# Patient Record
Sex: MALE | Race: WHITE | NOT HISPANIC OR LATINO | Employment: FULL TIME | URBAN - METROPOLITAN AREA
[De-identification: names, ages, dates, MRNs, and addresses within clinical notes are randomized per-mention and may not be internally consistent; named-entity substitution may affect disease eponyms.]

---

## 2023-09-26 ENCOUNTER — LAB (OUTPATIENT)
Dept: LAB | Facility: LAB | Age: 23
End: 2023-09-26
Payer: COMMERCIAL

## 2023-09-26 LAB — TOBACCO SCREEN, URINE: NEGATIVE

## 2023-12-07 ENCOUNTER — EVALUATION (OUTPATIENT)
Dept: PHYSICAL THERAPY | Facility: CLINIC | Age: 23
End: 2023-12-07
Payer: COMMERCIAL

## 2023-12-07 DIAGNOSIS — S76.012A STRAIN OF LEFT HIP ADDUCTOR MUSCLE: ICD-10-CM

## 2023-12-07 DIAGNOSIS — S76.012A STRAIN OF LEFT HIP ADDUCTOR MUSCLE, INITIAL ENCOUNTER: Primary | ICD-10-CM

## 2023-12-07 PROCEDURE — 97530 THERAPEUTIC ACTIVITIES: CPT | Mod: GP | Performed by: PHYSICAL THERAPIST

## 2023-12-07 PROCEDURE — 97161 PT EVAL LOW COMPLEX 20 MIN: CPT | Mod: GP | Performed by: PHYSICAL THERAPIST

## 2023-12-07 PROCEDURE — 97110 THERAPEUTIC EXERCISES: CPT | Mod: GP | Performed by: PHYSICAL THERAPIST

## 2023-12-07 ASSESSMENT — ENCOUNTER SYMPTOMS
LOSS OF SENSATION IN FEET: 0
OCCASIONAL FEELINGS OF UNSTEADINESS: 0
DEPRESSION: 0

## 2023-12-07 NOTE — PROGRESS NOTES
Physical Therapy Evaluation and Treatment      Patient Name: Murali García  MRN: [id number]  Today's Date: 12/7/2023  Time Calculation  Start Time: 1445  Stop Time: 1526  Time Calculation (min): 41 min  PT Therapeutic Procedures Time Entry  Therapeutic Exercise Time Entry: 16  Therapeutic Activity Time Entry: 10    Current Problem:   1. Strain of left hip adductor muscle, initial encounter        2. 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 Unsure if he twisted wrong when playing. Pain has gotten slightly better but it is still uncomfortable. Planting foot and trying to move is uncomfortable. Turning over in bed is hard. Coughing, sneezing also painful. Xrays negative. Reports never stretching.     Works in maintenance at  [employer]. Would like to return to [employer].     Precautions: None  Pain: 4-6/10       Objective   ROM   Left Hip AROM:  Hip flex 95 deg  Hip ER 10 deg  Hip IR neutral (very limited here)  *Pain with endrange AROM     MMT   Left LE strength:  Hip flex 5/5  Hip ADD 4-/5 with pain  Hip ABD 4/5   Knee flex 5/5  Knee ext 4/5 with pain    Dermatomes/Myotomes/Reflexes  Denies numbness/tingling    Palpation   TTP left hip adductors. TTP left lower abdominals     Flexibility  Moderate tightness in left hip adductors       Gait   Ambulates with mild left LE in ER, slight decrease in madyson     Stairs   Normal pattern    Outcome Measures:  LEFS: 49/80    Treatments:  Therapeutic Exercise:  Adductor stretch at step, 30 seconds hold  Bridge, x10  Hip ADD with ball, x10  S/L hip ADD, x10    Therapeutic Activity:  Educated pt on eval findings and POC  Educated pt on anatomy of hip  Advised pt to avoid hockey at this time. Discussed timeframe to heal    Assessment   Pt is a 23 y.o. male with left hip adductor  strain. Pt with decreased ROM, reduced strength, gait deficits, tenderness to palpation, and flexibility restrictions. Pt will benefit from skilled PT to address the above deficits for return to full activities.       Low complexity due to patient's clinical presentation being stable and uncomplicated by any significant comorbidities that may affect rehab tolerance and progression.     Plan:   Treatment/Interventions: Dry needling, Education/ Instruction, Gait training, Manual therapy, Neuromuscular re-education, Self care/ home management, Taping techniques, Therapeutic activities, Therapeutic exercises  PT Plan: Skilled PT  PT Frequency: 1 time per week  Duration: 3 more visits  Rehab Potential: Excellent  Plan of Care Agreement: Patient        Goals:   Active       Mobility       Goal 1       Start:  12/07/23    Expected End:  03/06/24       Pt will improve left hip AROM to WNL to improve sport activities         Goal 2       Start:  12/07/23    Expected End:  03/06/24       Pt will improve left LE strength to 5/5 to improve sport activities            Pain       Goal 1       Start:  12/07/23    Expected End:  03/06/24       Pt will perform all work activities with 0/10 pain         Goal 2       Start:  12/07/23    Expected End:  03/06/24       Pt will return to full hockey activities with 0/10 pain.

## 2023-12-14 ENCOUNTER — TREATMENT (OUTPATIENT)
Dept: PHYSICAL THERAPY | Facility: CLINIC | Age: 23
End: 2023-12-14
Payer: COMMERCIAL

## 2023-12-14 DIAGNOSIS — S76.012D STRAIN OF LEFT HIP ADDUCTOR MUSCLE, SUBSEQUENT ENCOUNTER: Primary | ICD-10-CM

## 2023-12-14 DIAGNOSIS — S76.012A STRAIN OF LEFT HIP ADDUCTOR MUSCLE: ICD-10-CM

## 2023-12-14 PROCEDURE — 97110 THERAPEUTIC EXERCISES: CPT | Mod: GP,CQ

## 2023-12-14 ASSESSMENT — PAIN - FUNCTIONAL ASSESSMENT: PAIN_FUNCTIONAL_ASSESSMENT: 0-10

## 2023-12-14 ASSESSMENT — PAIN DESCRIPTION - DESCRIPTORS: DESCRIPTORS: DISCOMFORT;JABBING;SHARP

## 2023-12-14 ASSESSMENT — PAIN SCALES - GENERAL: PAINLEVEL_OUTOF10: 4

## 2023-12-14 NOTE — PROGRESS NOTES
"Physical Therapy    Physical Therapy Treatment    Patient Name: Murali García  MRN: [id number]  Today's Date: 12/14/2023  Time Calculation  Start Time: 1517  Stop Time: 1603  Time Calculation (min): 46 min  Visit 2    Assessment:  PT Assessment  PT Assessment Results: Decreased strength, Decreased range of motion, Decreased endurance, Decreased mobility, Pain  Rehab Prognosis: Excellent  Evaluation/Treatment Tolerance: Patient tolerated treatment well  Plan:  OP PT Plan  Treatment/Interventions: Dry needling, Education/ Instruction, Gait training, Manual therapy, Neuromuscular re-education, Self care/ home management, Taping techniques, Therapeutic activities, Therapeutic exercises  PT Plan: Skilled PT  PT Frequency: 1 time per week  Duration: 3 more visits  Rehab Potential: Excellent  Plan of Care Agreement: Patient    Current Problem  1. Strain of left hip adductor muscle, subsequent encounter        2. Strain of left hip adductor muscle  Follow Up In Physical Therapy          General  PT  Visit  PT Received On: 12/14/23  Response to Previous Treatment: Patient with no complaints from previous session.  General  Reason for Referral: L hip adductor strain  Referred By: Dr Benítez    Subjective    Pt reports mild pain that increases with unexpected resisted movement.    Precautions  Precautions  Precautions Comment: Low risk    Pain  Pain Assessment  Pain Assessment: 0-10  Pain Score: 4  Pain Location: Groin  Pain Orientation: Left  Pain Descriptors: Discomfort, Jabbing, Sharp  Pain Frequency: Constant/continuous  Pain Onset: Ongoing    Objective   Minimal antalgic gait on arrival.    Activity Tolerance:  Activity Tolerance  Endurance: Tolerates 30+ min exercise without fatigue    Treatments:  Therapeutic Exercise  Therapeutic Exercise Performed: Yes  Therapeutic Exercise Activity 1: Nustep L5 6'  Therapeutic Exercise Activity 2: Standing hip adductor stretches 30\"x3  Therapeutic Exercise Activity 3: Isometric hip " "adduction (ball squeeze) 2x10  Therapeutic Exercise Activity 4: Bridge with hip adduction 5\" hold 2x10  Therapeutic Exercise Activity 5: Sidelying hip adduction 2x10  Therapeutic Exercise Activity 6: Sidestep with PTB above knees in guard position 20'x3 L/R each  Therapeutic Exercise Activity 7: Zig-Zag with PTB above knees guard position 20'x3 FWD/BWD each  Therapeutic Exercise Activity 8: Squat on foam pad 2x10  Therapeutic Exercise Activity 9: Abdominal brace 5\" hold 2x10    OP EDUCATION:  Outpatient Education  Individual(s) Educated: Patient  Education Provided: Body Mechanics, Home Exercise Program  Patient/Caregiver Demonstrated Understanding: yes  Patient Response to Education: Patient/Caregiver Verbalized Understanding of Information, Patient/Caregiver Performed Return Demonstration of Exercises/Activities, Patient/Caregiver Asked Appropriate Questions    Goals:    Active         Mobility         Goal 1         Start:  12/07/23    Expected End:  03/06/24        Pt will improve left hip AROM to WNL to improve sport activities           Goal 2         Start:  12/07/23    Expected End:  03/06/24        Pt will improve left LE strength to 5/5 to improve sport activities              Pain         Goal 1         Start:  12/07/23    Expected End:  03/06/24        Pt will perform all work activities with 0/10 pain           Goal 2         Start:  12/07/23    Expected End:  03/06/24        Pt will return to full hockey activities with 0/10 pain.           "

## 2024-01-23 ENCOUNTER — DOCUMENTATION (OUTPATIENT)
Dept: PHYSICAL THERAPY | Facility: CLINIC | Age: 24
End: 2024-01-23
Payer: COMMERCIAL

## 2024-01-23 NOTE — PROGRESS NOTES
Physical Therapy    Discharge Summary    Name: Murali García  MRN: [id number]  : 2000  Date: 24    Discharge Summary: PT    Discharge Information: Date of last visit 2023, Date of evaluation 2023, and Number of attended visits 2    Therapy Summary: PT with good progress during therapy and has met goals with return to sport activities.    Discharge Status: Goals met     Rehab Discharge Reason: Achieved all and/or the most significant goals(s)